# Patient Record
(demographics unavailable — no encounter records)

---

## 2025-02-12 NOTE — PHYSICAL EXAM
[Left] : left foot and ankle [] : medial ankle joint line tenderness [5___] : eversion 5[unfilled]/5 [FreeTextEntry3] : He has a scabbed over hematoma noted over the anterior aspect of the distal third tibia.  This is where he struck the tibia on the chair rack.  He has moderate swelling and ecchymosis over the medial aspect of the ankle and foot [de-identified] : He is able to bear weight on the left foot with mild pain [TWNoteComboBox7] : dorsiflexion 10 degrees [de-identified] : plantar flexion 15 degrees [de-identified] : inversion 10 degrees [de-identified] : eversion 10 degrees

## 2025-02-12 NOTE — HISTORY OF PRESENT ILLNESS
[de-identified] : 02/12/25: He is for evaluation of a work-related injury to his left ankle.  He is known to work as a .  While at work on 2/5/2025 he slipped and rolled the left lower extremity and struck it as it went under the chair rack.  Since that time he has noted significant swelling and bruising into the medial aspect of the ankle.  He has been taking Advil for pain.  He notes limitations on his range of motion.  He has been able to ambulate with a mild limp.  And he has continued to work

## 2025-02-12 NOTE — PHYSICAL EXAM
[Left] : left foot and ankle [] : medial ankle joint line tenderness [5___] : eversion 5[unfilled]/5 [FreeTextEntry3] : He has a scabbed over hematoma noted over the anterior aspect of the distal third tibia.  This is where he struck the tibia on the chair rack.  He has moderate swelling and ecchymosis over the medial aspect of the ankle and foot [de-identified] : He is able to bear weight on the left foot with mild pain [TWNoteComboBox7] : dorsiflexion 10 degrees [de-identified] : plantar flexion 15 degrees [de-identified] : inversion 10 degrees [de-identified] : eversion 10 degrees

## 2025-02-12 NOTE — WORK
[Sprain/Strain] : sprain/strain [Was the competent medical cause of the injury] : was the competent medical cause of the injury [Are consistent with the injury] : are consistent with the injury [Consistent with my objective findings] : consistent with my objective findings [Mild Partial] : mild partial [Does not reveal pre-existing condition(s) that may affect treatment/prognosis] : does not reveal pre-existing condition(s) that may affect treatment/prognosis [Can return to work without limitations on ______] : can return to work without limitations on [unfilled] [N/A] : : Not Applicable [Patient] : patient [No Rx restrictions] : No Rx restrictions. [I provided the services listed above] :  I provided the services listed above. [FreeTextEntry1] : good  [Heavy Work:] : Heavy Work: Exerting 50 to 100 pounds of force occasionally, and/or 25 to 50 pounds of force frequently, and/or 10 to 20 pounds of force constantly to move objects. Physical demand requirements are in excess of those for Medium Work

## 2025-02-12 NOTE — ASSESSMENT
[FreeTextEntry1] : - Prescription for diclofenac is provided for pain and swelling  The patient's orthopaedic condition(s) warrants intermittent use of a prescription strength non-steroidal anti-inflammatory medication.  These medications are associated with risks including but not limited to gastrointestinal irritation, kidney damage, hypertension, and bleeding.  The patient understands and will take medications as prescribed.  The patient will stop the medication and consult a physician as needed if problems arise. -We will get him in a lace up type brace for working ambulating and increased activities -Discussed the role of ice elevation and home range of motion exercises -He may continue to work -We will get him in a course of physical therapy -He will follow-up in 2 weeks time for evaluation with Dr. IDANIA Porter location

## 2025-02-12 NOTE — HISTORY OF PRESENT ILLNESS
[de-identified] : 02/12/25: He is for evaluation of a work-related injury to his left ankle.  He is known to work as a .  While at work on 2/5/2025 he slipped and rolled the left lower extremity and struck it as it went under the chair rack.  Since that time he has noted significant swelling and bruising into the medial aspect of the ankle.  He has been taking Advil for pain.  He notes limitations on his range of motion.  He has been able to ambulate with a mild limp.  And he has continued to work

## 2025-02-25 NOTE — WORK
[Other: ___] : [unfilled] [Was the competent medical cause of the injury] : was the competent medical cause of the injury [Are consistent with the injury] : are consistent with the injury [Consistent with my objective findings] : consistent with my objective findings [Does not reveal pre-existing condition(s) that may affect treatment/prognosis] : does not reveal pre-existing condition(s) that may affect treatment/prognosis [Can return to work without limitations on ______] : can return to work without limitations on [unfilled] [No Rx restrictions] : No Rx restrictions. [I provided the services listed above] :  I provided the services listed above. [FreeTextEntry1] : Good

## 2025-02-25 NOTE — HISTORY OF PRESENT ILLNESS
[de-identified] : / DOI; 02/05/25  L leg    02/25/25 / Patient is a 36-year-old male complaining of left ankle pain. He states he was working  and slipped backwards using chair rack when it struck L > R anterior shins.  No prior.  02/12/25 He went to the [place] location. XR was taken and it concludes a contusion. He wears the ankle  brace when he is working. Denies any prior trauma or injury. WB in slip on shoes   Working- [FreeTextEntry1] : left ankle

## 2025-02-25 NOTE — PHYSICAL EXAM
[Left] : left foot and ankle [NL (20)] : dorsiflexion 20 degrees [NL (40)] : plantar flexion 40 degrees [5___] : eversion 5[unfilled]/5 [2+] : dorsalis pedis pulse: 2+ [] : patient ambulates without assistive device [FreeTextEntry3] : anterior ankle hematoma [FreeTextEntry8] : tender 3-4 cm prox to ankle jt anterior